# Patient Record
(demographics unavailable — no encounter records)

---

## 2025-01-21 NOTE — HISTORY OF PRESENT ILLNESS
[FreeTextEntry1] : pt presents for cpe  [de-identified] : pt presents for cpe  she is feeling well she said she noticed a lymph node behind her right ear at times

## 2025-01-21 NOTE — HEALTH RISK ASSESSMENT
[0] : 2) Feeling down, depressed, or hopeless: Not at all (0) [PHQ-2 Negative - No further assessment needed] : PHQ-2 Negative - No further assessment needed [Never] : Never [Smoke Detector] : smoke detector [Carbon Monoxide Detector] : carbon monoxide detector [Seat Belt] :  uses seat belt [Sunscreen] : uses sunscreen [[id number]] : 0

## 2025-01-21 NOTE — PHYSICAL EXAM
[Supple] : supple [Normal Rate] : normal rate  [Regular Rhythm] : with a regular rhythm [Normal S1, S2] : normal S1 and S2 [No Edema] : there was no peripheral edema [Declined Breast Exam] : declined breast exam  [Normal] : soft, non-tender, non-distended, no masses palpated, no HSM and normal bowel sounds [Normal Posterior Cervical Nodes] : no posterior cervical lymphadenopathy [Normal Anterior Cervical Nodes] : no anterior cervical lymphadenopathy [No CVA Tenderness] : no CVA  tenderness [No Rash] : no rash [No Focal Deficits] : no focal deficits [Normal Affect] : the affect was normal [Normal Mood] : the mood was normal [Normal Insight/Judgement] : insight and judgment were intact [de-identified] : anterior cervical lymphadenopathy and small right postauricular lymph node  [de-identified] : 2/6 systolic murmur  [de-identified] : no calf tenderness b/l LE [de-identified] :  no guarding or rigidity [de-identified] : CN 2-12 INTACT, NORMAL STRENGTH UPPER AND LOWER EXT B/L 5/5, NORMAL RAPID ALTERNATING MOVEMENTS AND FINGER TO NOSE

## 2025-01-21 NOTE — PLAN
[FreeTextEntry1] : CPE -Labs to be done at lab fasting  -Offered HIV/ STD/ Hep C Screening agreed  -Advised annual ophthalmology, dental and dermatology visits -Advised monthly breast exams -Annual depression screening - negative     hx thyroid nodule which resolved seen by endocrine in the past for this and told she didnt have to f/u  anterior cervical lymphadenopathy and small right postauricular lymph node  us neck cbc  systolic murmur  advised to f/u with cardiologist  advised if patient doesnt hear from me 1 week after testing to call office for results , patient agreed w/plan and understood.